# Patient Record
Sex: FEMALE | Race: WHITE | NOT HISPANIC OR LATINO | Employment: FULL TIME | URBAN - METROPOLITAN AREA
[De-identification: names, ages, dates, MRNs, and addresses within clinical notes are randomized per-mention and may not be internally consistent; named-entity substitution may affect disease eponyms.]

---

## 2017-04-06 DIAGNOSIS — Z01.812 PRE-PROCEDURAL LABORATORY EXAMINATION: ICD-10-CM

## 2017-04-06 LAB
AMORPH CRY #/AREA URNS HPF: PRESENT /HPF
ANION GAP SERPL CALC-SCNC: 8 MMOL/L (ref 0–11.9)
APPEARANCE UR: CLEAR
BILIRUB UR QL STRIP.AUTO: NEGATIVE
BUN SERPL-MCNC: 8 MG/DL (ref 8–22)
CALCIUM SERPL-MCNC: 10.4 MG/DL (ref 8.5–10.5)
CHLORIDE SERPL-SCNC: 104 MMOL/L (ref 96–112)
CO2 SERPL-SCNC: 25 MMOL/L (ref 20–33)
COLOR UR: ABNORMAL
CREAT SERPL-MCNC: 0.62 MG/DL (ref 0.5–1.4)
CULTURE IF INDICATED INDCX: NO UA CULTURE
EPI CELLS #/AREA URNS HPF: ABNORMAL /HPF
ERYTHROCYTE [DISTWIDTH] IN BLOOD BY AUTOMATED COUNT: 39.8 FL (ref 35.9–50)
GFR SERPL CREATININE-BSD FRML MDRD: >60 ML/MIN/1.73 M 2
GLUCOSE SERPL-MCNC: 83 MG/DL (ref 65–99)
GLUCOSE UR STRIP.AUTO-MCNC: NEGATIVE MG/DL
HCG SERPL QL: NEGATIVE
HCT VFR BLD AUTO: 43.6 % (ref 37–47)
HGB BLD-MCNC: 14.6 G/DL (ref 12–16)
KETONES UR STRIP.AUTO-MCNC: NEGATIVE MG/DL
LEUKOCYTE ESTERASE UR QL STRIP.AUTO: NEGATIVE
MCH RBC QN AUTO: 28.9 PG (ref 27–33)
MCHC RBC AUTO-ENTMCNC: 33.5 G/DL (ref 33.6–35)
MCV RBC AUTO: 86.3 FL (ref 81.4–97.8)
MICRO URNS: ABNORMAL
MUCOUS THREADS #/AREA URNS HPF: ABNORMAL /HPF
NITRITE UR QL STRIP.AUTO: NEGATIVE
PH UR STRIP.AUTO: 5.5 [PH]
PLATELET # BLD AUTO: 252 K/UL (ref 164–446)
PMV BLD AUTO: 10.4 FL (ref 9–12.9)
POTASSIUM SERPL-SCNC: 3.9 MMOL/L (ref 3.6–5.5)
PROT UR QL STRIP: NEGATIVE MG/DL
RBC # BLD AUTO: 5.05 M/UL (ref 4.2–5.4)
RBC # URNS HPF: ABNORMAL /HPF
RBC UR QL AUTO: ABNORMAL
SODIUM SERPL-SCNC: 137 MMOL/L (ref 135–145)
SP GR UR STRIP.AUTO: 1.01
WBC # BLD AUTO: 6.9 K/UL (ref 4.8–10.8)
WBC #/AREA URNS HPF: ABNORMAL /HPF

## 2017-04-06 PROCEDURE — 80048 BASIC METABOLIC PNL TOTAL CA: CPT

## 2017-04-06 PROCEDURE — 85027 COMPLETE CBC AUTOMATED: CPT

## 2017-04-06 PROCEDURE — 81001 URINALYSIS AUTO W/SCOPE: CPT

## 2017-04-06 PROCEDURE — 36415 COLL VENOUS BLD VENIPUNCTURE: CPT

## 2017-04-06 PROCEDURE — 84703 CHORIONIC GONADOTROPIN ASSAY: CPT

## 2017-04-18 NOTE — H&P
HISTORY OF PRESENT ILLNESS:  The patient is a 31-year-old  4, para 3,   female, who is having significant problems including severe dyspareunia,   pelvic pain, dysmenorrhea and documented endometriosis by laparoscopy.  This   pain has been failed to be controlled with birth control pills.  Currently,   using a vasectomy for contraception.  Menses are regular, occasional spotting,   dysmenorrhea is associated with her cycles.    PAST MEDICAL HISTORY:  The patient denies any significant past medical   history.    PAST SURGICAL HISTORY:  She has no surgical history listed, but she did have a   pelviscopy previously.  This documented endometriosis and was performed by   Dr. Manning in [place].    FAMILY HISTORY:  Significant for diabetes, heart disease, and ovarian cancer.    REPRODUCTIVE HISTORY:   4, para 3.    SOCIAL HISTORY:  Denies alcohol and works .  Denies tobacco use.     female.    REVIEW OF SYSTEMS:  Negative other than the above referenced complaints.    Specifically denies fever, chills, nausea, vomiting, constipation, diarrhea,   chest pain, shortness breath, chronic cough, coughing up blood.    PHYSICAL EXAMINATION:  GENERAL:  Well-developed, pleasant female, in no acute distress.  HEENT:  Normocephalic.  EOMs intact.  Pupils reactive to light.  Throat clear.  NECK:  Supple without adenopathy.  CHEST:  Clear to auscultation.  CARDIAC:  Regular rhythm, no murmurs.  BREASTS:  No masses.  No dimpling, no retraction or axillary adenopathy.  PELVIC:  Reveals normal external genitalia.  Normal-appearing cervix.  Uterus   is mildly enlarged.  No adnexal masses were appreciated.  She does have   tenderness to movement of the uterus and tenderness in both adnexa to   palpation.    IMPRESSION:  Dysmenorrhea, pelvic pain, documented endometriosis refractory to   control with birth control pills.    PLAN:  The patient is being admitted for total laparoscopic hysterectomy,   bilateral  salpingectomy, preservation of her ovaries.  Surgical risks and   complications have been explained to the patient at length and in detail.    Specific risks, bleeding, infection, damage to adjacent structures including   bowel, bladder, ureter, major blood vessels were all discussed.  Possibility   of transfusion, anesthetic risks, thromboembolic risk, and laparotomy were   discussed.  The patient understands and accepts these risks.  Preoperative and   postoperative expectations had been discussed.  The instructions have been   given.  The patient has her medications at home for postop.       ____________________________________     MD EMILY BOWER / TIMO    DD:  04/18/2017 08:10:56  DT:  04/18/2017 09:17:08    D#:  [id number]  Job#:  [id number]

## 2017-04-19 ENCOUNTER — HOSPITAL ENCOUNTER (OUTPATIENT)
Facility: MEDICAL CENTER | Age: 32
End: 2017-04-19
Attending: OBSTETRICS & GYNECOLOGY | Admitting: OBSTETRICS & GYNECOLOGY
Payer: COMMERCIAL

## 2017-04-19 VITALS
TEMPERATURE: 97.9 F | OXYGEN SATURATION: 98 % | HEIGHT: 62 IN | WEIGHT: 123.46 LBS | RESPIRATION RATE: 12 BRPM | SYSTOLIC BLOOD PRESSURE: 110 MMHG | DIASTOLIC BLOOD PRESSURE: 74 MMHG | BODY MASS INDEX: 22.72 KG/M2 | HEART RATE: 88 BPM

## 2017-04-19 PROBLEM — R10.32 ABDOMINAL PAIN, LEFT LOWER QUADRANT: Status: ACTIVE | Noted: 2017-04-19

## 2017-04-19 PROBLEM — R10.32 ABDOMINAL PAIN, LEFT LOWER QUADRANT: Status: RESOLVED | Noted: 2017-04-19 | Resolved: 2017-04-19

## 2017-04-19 LAB
HCG UR QL: NEGATIVE
SP GR UR REFRACTOMETRY: 1.02

## 2017-04-19 PROCEDURE — 500854 HCHG NEEDLE, INSUFFLATION FOR STEP: Performed by: OBSTETRICS & GYNECOLOGY

## 2017-04-19 PROCEDURE — 700111 HCHG RX REV CODE 636 W/ 250 OVERRIDE (IP)

## 2017-04-19 PROCEDURE — 502679 HCHG MANIPULATOR, UTRN DAVINCI: Performed by: OBSTETRICS & GYNECOLOGY

## 2017-04-19 PROCEDURE — 160048 HCHG OR STATISTICAL LEVEL 1-5: Performed by: OBSTETRICS & GYNECOLOGY

## 2017-04-19 PROCEDURE — 500002 HCHG ADHESIVE, DERMABOND: Performed by: OBSTETRICS & GYNECOLOGY

## 2017-04-19 PROCEDURE — 160031 HCHG SURGERY MINUTES - 1ST 30 MINS LEVEL 5: Performed by: OBSTETRICS & GYNECOLOGY

## 2017-04-19 PROCEDURE — 160047 HCHG PACU  - EA ADDL 30 MINS PHASE II: Performed by: OBSTETRICS & GYNECOLOGY

## 2017-04-19 PROCEDURE — 500025 HCHG ARMREST, CRADLE FOAM: Performed by: OBSTETRICS & GYNECOLOGY

## 2017-04-19 PROCEDURE — 160046 HCHG PACU - 1ST 60 MINS PHASE II: Performed by: OBSTETRICS & GYNECOLOGY

## 2017-04-19 PROCEDURE — 502714 HCHG ROBOTIC SURGERY SERVICES: Performed by: OBSTETRICS & GYNECOLOGY

## 2017-04-19 PROCEDURE — 501330 HCHG SET, CYSTO IRRIG TUBING: Performed by: OBSTETRICS & GYNECOLOGY

## 2017-04-19 PROCEDURE — 110382 HCHG SHELL REV 271: Performed by: OBSTETRICS & GYNECOLOGY

## 2017-04-19 PROCEDURE — 501838 HCHG SUTURE GENERAL: Performed by: OBSTETRICS & GYNECOLOGY

## 2017-04-19 PROCEDURE — 160009 HCHG ANES TIME/MIN: Performed by: OBSTETRICS & GYNECOLOGY

## 2017-04-19 PROCEDURE — 700101 HCHG RX REV CODE 250

## 2017-04-19 PROCEDURE — 502000 HCHG MISC OR IMPLANTS RC 0278: Performed by: OBSTETRICS & GYNECOLOGY

## 2017-04-19 PROCEDURE — 501587 HCHG TROCAR, VERSAPORT 12MM LONG: Performed by: OBSTETRICS & GYNECOLOGY

## 2017-04-19 PROCEDURE — 160025 RECOVERY II MINUTES (STATS): Performed by: OBSTETRICS & GYNECOLOGY

## 2017-04-19 PROCEDURE — 88307 TISSUE EXAM BY PATHOLOGIST: CPT

## 2017-04-19 PROCEDURE — 160036 HCHG PACU - EA ADDL 30 MINS PHASE I: Performed by: OBSTETRICS & GYNECOLOGY

## 2017-04-19 PROCEDURE — 160002 HCHG RECOVERY MINUTES (STAT): Performed by: OBSTETRICS & GYNECOLOGY

## 2017-04-19 PROCEDURE — 502594 HCHG SCISSOR HANDLE, HARMONIC ACE: Performed by: OBSTETRICS & GYNECOLOGY

## 2017-04-19 PROCEDURE — 700102 HCHG RX REV CODE 250 W/ 637 OVERRIDE(OP)

## 2017-04-19 PROCEDURE — 160042 HCHG SURGERY MINUTES - EA ADDL 1 MIN LEVEL 5: Performed by: OBSTETRICS & GYNECOLOGY

## 2017-04-19 PROCEDURE — A4606 OXYGEN PROBE USED W OXIMETER: HCPCS | Performed by: OBSTETRICS & GYNECOLOGY

## 2017-04-19 PROCEDURE — 110371 HCHG SHELL REV 272: Performed by: OBSTETRICS & GYNECOLOGY

## 2017-04-19 PROCEDURE — 81025 URINE PREGNANCY TEST: CPT

## 2017-04-19 PROCEDURE — 160035 HCHG PACU - 1ST 60 MINS PHASE I: Performed by: OBSTETRICS & GYNECOLOGY

## 2017-04-19 PROCEDURE — A9270 NON-COVERED ITEM OR SERVICE: HCPCS

## 2017-04-19 PROCEDURE — 502240 HCHG MISC OR SUPPLY RC 0272: Performed by: OBSTETRICS & GYNECOLOGY

## 2017-04-19 DEVICE — TISSEEL 4ML ----MUST ORDER A MIN OF 6EA----: Type: IMPLANTABLE DEVICE | Status: FUNCTIONAL

## 2017-04-19 RX ORDER — BUPIVACAINE HYDROCHLORIDE AND EPINEPHRINE 2.5; 5 MG/ML; UG/ML
INJECTION, SOLUTION EPIDURAL; INFILTRATION; INTRACAUDAL; PERINEURAL
Status: DISCONTINUED | OUTPATIENT
Start: 2017-04-19 | End: 2017-04-19 | Stop reason: HOSPADM

## 2017-04-19 RX ORDER — SODIUM CHLORIDE, SODIUM LACTATE, POTASSIUM CHLORIDE, CALCIUM CHLORIDE 600; 310; 30; 20 MG/100ML; MG/100ML; MG/100ML; MG/100ML
1000 INJECTION, SOLUTION INTRAVENOUS
Status: COMPLETED | OUTPATIENT
Start: 2017-04-19 | End: 2017-04-19

## 2017-04-19 RX ORDER — MIDAZOLAM HYDROCHLORIDE 1 MG/ML
INJECTION INTRAMUSCULAR; INTRAVENOUS
Status: DISCONTINUED
Start: 2017-04-19 | End: 2017-04-19 | Stop reason: HOSPADM

## 2017-04-19 RX ORDER — SODIUM CHLORIDE, SODIUM LACTATE, POTASSIUM CHLORIDE, CALCIUM CHLORIDE 600; 310; 30; 20 MG/100ML; MG/100ML; MG/100ML; MG/100ML
INJECTION, SOLUTION INTRAVENOUS CONTINUOUS
Status: DISCONTINUED | OUTPATIENT
Start: 2017-04-19 | End: 2017-04-19 | Stop reason: HOSPADM

## 2017-04-19 RX ORDER — OXYCODONE HCL 5 MG/5 ML
SOLUTION, ORAL ORAL
Status: COMPLETED
Start: 2017-04-19 | End: 2017-04-19

## 2017-04-19 RX ORDER — OXYCODONE HYDROCHLORIDE AND ACETAMINOPHEN 5; 325 MG/1; MG/1
1 TABLET ORAL
Status: DISCONTINUED | OUTPATIENT
Start: 2017-04-19 | End: 2017-04-19 | Stop reason: HOSPADM

## 2017-04-19 RX ORDER — METOCLOPRAMIDE HYDROCHLORIDE 5 MG/ML
10 INJECTION INTRAMUSCULAR; INTRAVENOUS EVERY 4 HOURS PRN
Status: DISCONTINUED | OUTPATIENT
Start: 2017-04-19 | End: 2017-04-19 | Stop reason: HOSPADM

## 2017-04-19 RX ORDER — DIPHENHYDRAMINE HYDROCHLORIDE 50 MG/ML
INJECTION INTRAMUSCULAR; INTRAVENOUS
Status: COMPLETED
Start: 2017-04-19 | End: 2017-04-19

## 2017-04-19 RX ORDER — KETOROLAC TROMETHAMINE 30 MG/ML
INJECTION, SOLUTION INTRAMUSCULAR; INTRAVENOUS
Status: COMPLETED
Start: 2017-04-19 | End: 2017-04-19

## 2017-04-19 RX ORDER — HYDROMORPHONE HYDROCHLORIDE 2 MG/ML
INJECTION, SOLUTION INTRAMUSCULAR; INTRAVENOUS; SUBCUTANEOUS
Status: DISCONTINUED
Start: 2017-04-19 | End: 2017-04-19 | Stop reason: HOSPADM

## 2017-04-19 RX ORDER — ONDANSETRON 2 MG/ML
4 INJECTION INTRAMUSCULAR; INTRAVENOUS EVERY 6 HOURS PRN
Status: DISCONTINUED | OUTPATIENT
Start: 2017-04-19 | End: 2017-04-19 | Stop reason: HOSPADM

## 2017-04-19 RX ORDER — LIDOCAINE HYDROCHLORIDE 10 MG/ML
INJECTION, SOLUTION INFILTRATION; PERINEURAL
Status: COMPLETED
Start: 2017-04-19 | End: 2017-04-19

## 2017-04-19 RX ORDER — ACETAMINOPHEN 500 MG
1000 TABLET ORAL EVERY 6 HOURS PRN
COMMUNITY

## 2017-04-19 RX ADMIN — FENTANYL CITRATE 25 MCG: 50 INJECTION, SOLUTION INTRAMUSCULAR; INTRAVENOUS at 09:35

## 2017-04-19 RX ADMIN — HYDROMORPHONE HYDROCHLORIDE 0.2 MG: 1 INJECTION, SOLUTION INTRAMUSCULAR; INTRAVENOUS; SUBCUTANEOUS at 09:35

## 2017-04-19 RX ADMIN — SODIUM CHLORIDE, SODIUM LACTATE, POTASSIUM CHLORIDE, CALCIUM CHLORIDE 1000 ML: 600; 310; 30; 20 INJECTION, SOLUTION INTRAVENOUS at 06:30

## 2017-04-19 RX ADMIN — KETOROLAC TROMETHAMINE 30 MG: 30 INJECTION, SOLUTION INTRAMUSCULAR; INTRAVENOUS at 09:25

## 2017-04-19 RX ADMIN — DIPHENHYDRAMINE HYDROCHLORIDE 6.25 MG: 50 INJECTION INTRAMUSCULAR; INTRAVENOUS at 10:30

## 2017-04-19 RX ADMIN — OXYCODONE HYDROCHLORIDE 10 MG: 5 SOLUTION ORAL at 09:30

## 2017-04-19 RX ADMIN — LIDOCAINE HYDROCHLORIDE 0.1 ML: 10 INJECTION, SOLUTION INFILTRATION; PERINEURAL at 06:30

## 2017-04-19 ASSESSMENT — PAIN SCALES - GENERAL
PAINLEVEL_OUTOF10: 0

## 2017-04-19 ASSESSMENT — PAIN SCALES - WONG BAKER
WONGBAKER_NUMERICALRESPONSE: DOESN'T HURT AT ALL
WONGBAKER_NUMERICALRESPONSE: HURTS JUST A LITTLE BIT
WONGBAKER_NUMERICALRESPONSE: HURTS JUST A LITTLE BIT

## 2017-04-19 NOTE — DISCHARGE INSTRUCTIONS
ACTIVITY: Rest and take it easy for the first 24 hours.  A responsible adult is recommended to remain with you during that time.  It is normal to feel sleepy.  We encourage you to not do anything that requires balance, judgment or coordination.    MILD FLU-LIKE SYMPTOMS ARE NORMAL. YOU MAY EXPERIENCE GENERALIZED MUSCLE ACHES, THROAT IRRITATION, HEADACHE AND/OR SOME NAUSEA.    FOR 24 HOURS DO NOT:  Drive, operate machinery or run household appliances.  Drink beer or alcoholic beverages.   Make important decisions or sign legal documents.    SPECIAL INSTRUCTIONS:   Keep bowels soft and regular- may use milk of magnesia or colace as needed for constipation  Pelvic rest for 6 wks or until MD gives approval  May shower as desired  Clear liquids only until passing gas  May use Milk of Magnesia PRN constipation  May use Colace OTC 2 Xs daily PRN constipation  No tub baths, swimming or hot tubs for 6 weeks      DIET: To avoid nausea, slowly advance diet as tolerated, avoiding spicy or greasy foods for the first day.  Add more substantial food to your diet according to your physician's instructions.  INCREASE FLUIDS AND FIBER TO AVOID CONSTIPATION.    SURGICAL DRESSING/BATHING:   May shower as desired  No tub baths, swimming or hot tubs for 6 weeks    FOLLOW-UP APPOINTMENT:  A follow-up appointment should be arranged with your doctor in 2 weeks; call to schedule.    You should CALL YOUR PHYSICIAN if you develop:  Fever greater than 101 degrees F.  Pain not relieved by medication, or persistent nausea or vomiting.  Excessive bleeding (blood soaking through dressing) or unexpected drainage from the wound.  Extreme redness or swelling around the incision site, drainage of pus or foul smelling drainage.  Inability to urinate or empty your bladder within 8 hours.  Problems with breathing or chest pain.    You should call 911 if you develop problems with breathing or chest pain.  If you are unable to contact your doctor or  surgical center, you should go to the nearest emergency room or urgent care center.  Physician's telephone #: *[phone]**    If any questions arise, call your doctor.  If your doctor is not available, please feel free to call the Surgical Center at [phone].  The Center is open Monday through Friday from 7AM to 7PM.  You can also call the HEALTH HOTLINE open 24 hours/day, 7 days/week and speak to a nurse at [phone], or toll free at [phone].    A registered nurse may call you a few days after your surgery to see how you are doing after your procedure.    MEDICATIONS: Resume taking daily medication.  Take prescribed pain medication with food.  If no medication is prescribed, you may take non-aspirin pain medication if needed.  PAIN MEDICATION CAN BE VERY CONSTIPATING.  Take a stool softener or laxative such as senokot, pericolace, or milk of magnesia if needed.    Prescription given for *oxycodone, zofran and Reglan**.  Last pain medication given at *9:30am**.    If your physician has prescribed pain medication that includes Acetaminophen (Tylenol), do not take additional Acetaminophen (Tylenol) while taking the prescribed medication.    Depression / Suicide Risk    As you are discharged from this [facility] facility, it is important to learn how to keep safe from harming yourself.    Recognize the warning signs:  · Abrupt changes in personality, positive or negative- including increase in energy   · Giving away possessions  · Change in eating patterns- significant weight changes-  positive or negative  · Change in sleeping patterns- unable to sleep or sleeping all the time   · Unwillingness or inability to communicate  · Depression  · Unusual sadness, discouragement and loneliness  · Talk of wanting to die  · Neglect of personal appearance   · Rebelliousness- reckless behavior  · Withdrawal from people/activities they love  · Confusion- inability to concentrate     If you or a loved one  observes any of these behaviors or has concerns about self-harm, here's what you can do:  · Talk about it- your feelings and reasons for harming yourself  · Remove any means that you might use to hurt yourself (examples: pills, rope, extension cords, firearm)  · Get professional help from the community (Mental Health, Substance Abuse, psychological counseling)  · Do not be alone:Call your Safe Contact- someone whom you trust who will be there for you.  · Call your local CRISIS HOTLINE 777-2863 or 769-489-6650  · Call your local Children's Mobile Crisis Response Team [place] (071) 099-2082 or www.[employer]  · Call the toll free National Suicide Prevention Hotlines   · National Suicide Prevention Lifeline 112-574-ASGD (3587)  · National Hope Line Network 800-SUICIDE (945-9729)

## 2017-04-19 NOTE — OR NURSING
Assumed care back of pt. Assisted pt OOB to restroom, ambulates well. Pt was able to void approx. 150cc measured by hat. Pt returned to bed. Tolerated activity well.

## 2017-04-19 NOTE — OR SURGEON
Immediate Post-Operative Note      PreOp Diagnosis: Dysmenorrhea, endometriosis  PostOp Diagnosis: Same    Procedure(s):  HYSTERECTOMY ROBOTIC TOTAL WITH BILATERAL SAPINGECTOMY  - Wound Class: Clean Contaminated    Surgeon(s):  Peyman Nelson M.D.    Anesthesiologist/Type of Anesthesia:  Anesthesiologist: Leola Murray M.D./General    Surgical Staff:  Circulator: Alyssa Amador R.N.  Scrub Person: Sol Olivier Assist: Sobia Mullen  Count [place]: Say Coffey R.N.    Specimen: Uterus and fallopian tubes    Estimated Blood Loss: 25ml    Findings: endometriosis present on uterus and culdosac, patent ureters and normal bladder at cysto post procedure    Complications: None        4/19/2017 9:28 AM Peyman Nelson

## 2017-04-19 NOTE — OP REPORT
DATE OF SERVICE:  04/19/2017    PREOPERATIVE DIAGNOSES:  Documented endometriosis by laparoscopy,   dysmenorrhea, pelvic pain.    PREOPERATIVE DIAGNOSES:  Documented endometriosis by laparoscopy,   dysmenorrhea, pelvic pain with endometriosis confirmed.    OPERATION PERFORMED:  Total laparoscopic hysterectomy using da Herman robotic   technology, bilateral salpingectomy and cystoscopy.    SURGEON:  Peyman Nelson MD.    ASSISTANT:  Sobia Mullen.    INDICATIONS:  As above.    PROCEDURE:  Patient was taken to the operating room and placed on the   operating table and successfully given a general anesthetic.  She was placed   in the dorsal lithotomy position in the [facility].  Physiological   positioning was maintained throughout the case.  She was prepped and draped   both abdominally and pelvically and perineally in the usual fashion.  At this   juncture, she was draped as mentioned and the V-Care catheter was placed for   uterine manipulation by Sobia Mullen. Once this was in place, the uterus   was elevated, location of the camera port was delineated and small incision   was made above the umbilicus by about an 1-1/2 to 2 inches.  At this juncture,   the abdominal wall elevation was accomplished and the step system Veress   needle was inserted on a single attempt without difficulty.  Position was   confirmed with low pressures and pneumoperitoneum was created normally.    Following this, the primary trocar was inserted through the step system and   the camera was used to delineate the abdomen.  There was a single adhesion up   to the umbilicus from previous laparoscopy.  At this juncture, the left lower   quadrant port was placed which was an 8 mm port.  This was approximately 2 cm   above the anterior superior iliac spine and slightly medial.  Once this was in   position, scissors were used to remove the adhesions previously described.    Following that, the 2 lateral ports were placed without  difficulty.  Patient   was placed in steep Trendelenburg and docking was accomplished without   difficulty.  Twenty seven degrees Trendelenburg was used.  Following this, all   instruments were placed under direct visualization and the procedure was   initiated.  Findings at this juncture showed multiple small blister-like   lesions on the uterus and some on the peritoneal surfaces consistent with   early endometriosis.  There was some old blood in the cul-de-sac and also   consistent with early endometriosis.  The ovaries looked normal.  Fallopian   tubes looked normal.  The left fallopian tube was elevated.  The mesosalpinx   was cauterized and incised and this specimen was removed through the   assistance port.  Following this, the round ligament on the left side was   cauterized and incised.  The uterine ovarian ligament on the left side was   cauterized and incised and the area was hemostatic.  Leaves of the broad   ligament were now opened up.  The anterior leaf was incised taking the bladder   off the lower uterine segment on the left side.  Posterior leaf was also   incised down to the level of the VCare ring hugging the uterus.  At this   juncture, the individual vessels of the uterine vascular bundle and anteriorly   were cauterized, incised and reflected laterally.  Once this was   accomplished, we moved to the other side.  On the right side, the round   ligament was cauterized and incised.  The fallopian tube was then removed by   cauterizing the mesosalpinx and removing the tube through the assistance port.    Following this, the uterine ovarian ligament was cauterized and incised.    Hemostasis was complete.  At this juncture, the leaves of the broad ligament   were again  and the bladder was taken off of the lower uterine   segment.  The posterior leaf of the broad ligament was cauterized and incised   and reflected slightly laterally.  The uterine vascular bundles were   identified  individually cauterized and incised and reflected laterally.  Once   we had basically cleaned up the attachments and the vessels were all reflected   beyond the level of the VCare ring, entry into the vagina was accomplished   anteriorly.  The VCare ring was followed around carefully and ultimately the   cervix was completely detached from the vagina.  The specimen was removed.    Irrigation was carried out.  Angle sutures were placed at 3 and 9 o'clock.  A   Quill suture had been delivered through the vagina.  Next, the central portion   of the vaginal cuff was closed with a Quill starting in the middle and   running laterally to the angle sutures previously placed.  A second layer was   placed and attached to the uterosacral ligaments for support of the vagina.    Hemostasis was complete.  ____ was sprayed on the cuff.  There were no   complications.  Instruments were removed.  The camera port was closed with an   EndoStitch and all other ports were closed with deep stitches and BioGlue or   Dermabond.  Cystoscopy was performed.  Fluorescein dye was seen to flow freely   from both ureteral meatuses.  Bladder was absolutely normal with no evidence   of any trauma.  There were no apparent complications.  The patient was taken   to recovery room in stable condition.  Estimated blood loss was only about 25   mL    SPECIMENS:  Included the uterus and both fallopian tubes.       ____________________________________     MD EMILY BOWER / TIMO    DD:  04/19/2017 09:37:19  DT:  04/19/2017 10:20:00    D#:  [id number]  Job#:  [id number]

## 2017-04-19 NOTE — IP AVS SNAPSHOT
" Home Care Instructions                                                                                                                Name:Mai West  Medical Record Number:[id number]  CSN: [id number]    YOB: 1985   Age: 31 y.o.  Sex: female  HT:1.575 m (5' 2\") WT: 56 kg (123 lb 7.3 oz)          Admit Date: 4/19/2017     Discharge Date:   Today's Date: 4/19/2017  Attending Doctor:  Peyman Nelson M.D.                  Allergies:  Tobramycin                Discharge Instructions         ACTIVITY: Rest and take it easy for the first 24 hours.  A responsible adult is recommended to remain with you during that time.  It is normal to feel sleepy.  We encourage you to not do anything that requires balance, judgment or coordination.    MILD FLU-LIKE SYMPTOMS ARE NORMAL. YOU MAY EXPERIENCE GENERALIZED MUSCLE ACHES, THROAT IRRITATION, HEADACHE AND/OR SOME NAUSEA.    FOR 24 HOURS DO NOT:  Drive, operate machinery or run household appliances.  Drink beer or alcoholic beverages.   Make important decisions or sign legal documents.    SPECIAL INSTRUCTIONS:   Keep bowels soft and regular- may use milk of magnesia or colace as needed for constipation  Pelvic rest for 6 wks or until MD gives approval  May shower as desired  Clear liquids only until passing gas  May use Milk of Magnesia PRN constipation  May use Colace OTC 2 Xs daily PRN constipation  No tub baths, swimming or hot tubs for 6 weeks      DIET: To avoid nausea, slowly advance diet as tolerated, avoiding spicy or greasy foods for the first day.  Add more substantial food to your diet according to your physician's instructions.  INCREASE FLUIDS AND FIBER TO AVOID CONSTIPATION.    SURGICAL DRESSING/BATHING:   May shower as desired  No tub baths, swimming or hot tubs for 6 weeks    FOLLOW-UP APPOINTMENT:  A follow-up appointment should be arranged with your doctor in 2 weeks; call to schedule.    You should CALL YOUR PHYSICIAN if you develop:  Fever greater " than 101 degrees F.  Pain not relieved by medication, or persistent nausea or vomiting.  Excessive bleeding (blood soaking through dressing) or unexpected drainage from the wound.  Extreme redness or swelling around the incision site, drainage of pus or foul smelling drainage.  Inability to urinate or empty your bladder within 8 hours.  Problems with breathing or chest pain.    You should call 911 if you develop problems with breathing or chest pain.  If you are unable to contact your doctor or surgical center, you should go to the nearest emergency room or urgent care center.  Physician's telephone #: *[phone]**    If any questions arise, call your doctor.  If your doctor is not available, please feel free to call the Surgical Center at [phone].  The Center is open Monday through Friday from 7AM to 7PM.  You can also call the [employer] HOTLINE open 24 hours/day, 7 days/week and speak to a nurse at [phone], or toll free at [phone].    A registered nurse may call you a few days after your surgery to see how you are doing after your procedure.    MEDICATIONS: Resume taking daily medication.  Take prescribed pain medication with food.  If no medication is prescribed, you may take non-aspirin pain medication if needed.  PAIN MEDICATION CAN BE VERY CONSTIPATING.  Take a stool softener or laxative such as senokot, pericolace, or milk of magnesia if needed.    Prescription given for *oxycodone, zofran and Reglan**.  Last pain medication given at *9:30am**.    If your physician has prescribed pain medication that includes Acetaminophen (Tylenol), do not take additional Acetaminophen (Tylenol) while taking the prescribed medication.    Depression / Suicide Risk    As you are discharged from this [facility] facility, it is important to learn how to keep safe from harming yourself.    Recognize the warning signs:  · Abrupt changes in personality, positive or negative- including increase in energy    · Giving away possessions  · Change in eating patterns- significant weight changes-  positive or negative  · Change in sleeping patterns- unable to sleep or sleeping all the time   · Unwillingness or inability to communicate  · Depression  · Unusual sadness, discouragement and loneliness  · Talk of wanting to die  · Neglect of personal appearance   · Rebelliousness- reckless behavior  · Withdrawal from people/activities they love  · Confusion- inability to concentrate     If you or a loved one observes any of these behaviors or has concerns about self-harm, here's what you can do:  · Talk about it- your feelings and reasons for harming yourself  · Remove any means that you might use to hurt yourself (examples: pills, rope, extension cords, firearm)  · Get professional help from the community (Mental Health, Substance Abuse, psychological counseling)  · Do not be alone:Call your Safe Contact- someone whom you trust who will be there for you.  · Call your local CRISIS HOTLINE 113-9366 or 892-131-2671  · Call your local Children's Mobile Crisis Response Team [place] (433) 610-0484 or [employer]  · Call the toll free National Suicide Prevention Hotlines   · National Suicide Prevention Lifeline 431-850-LGDV (5780)  · National Hope Line Network 800-SUICIDE (716-8107)       Medication List      CONTINUE taking these medications        Instructions    Morning Afternoon Evening Bedtime    acetaminophen 500 MG Tabs   Commonly known as:  TYLENOL        Take 1,000 mg by mouth every 6 hours as needed.   Dose:  1000 mg                        DAYQUIL MULTI-SYMPTOM PO        Take 1 Tab by mouth as needed.   Dose:  1 Tab                        EMERGEN-C IMMUNE PO        Take 1 Packet by mouth as needed.   Dose:  1 Packet                                Medication Information     Above and/or attached are the medications your physician expects you to take upon discharge. Review all of your home medications and newly  ordered medications with your doctor and/or pharmacist. Follow medication instructions as directed by your doctor and/or pharmacist. Please keep your medication list with you and share with your physician. Update the information when medications are discontinued, doses are changed, or new medications (including over-the-counter products) are added; and carry medication information at all times in the event of emergency situations.        Resources     Quit Smoking / Tobacco Use:    I understand the use of any tobacco products increases my chance of suffering from future heart disease or stroke and could cause other illnesses which may shorten my life. Quitting the use of tobacco products is the single most important thing I can do to improve my health. For further information on smoking / tobacco cessation call a Toll Free Quit Line at 1-874.968.1431 (*[facility] [employer] [place]) or 1-834.570.8435 (American Lung Association) or you can access the web based program at www.lungusa.org.    [place] Tobacco Users Help Line:  (388) 109-9628       Toll Free: 1-471.509.9832  Quit Tobacco Program [facility] (049)002-8059    Crisis Hotline:    [place] Crisis Hotline:  2-795-RXLBVUC or 1-230.839.3026    [place] Crisis Hotline:    1-823.289.7982 or 705-059-8295    Discharge Survey:   Thank you for choosing [facility]. We hope we did everything we could to make your hospital stay a pleasant one. You may be receiving a survey and we would appreciate your time and participation in answering the questions. Your input is very valuable to us in our efforts to improve our service to our patients and their families.            Signatures     My signature on this form indicates that:    1. I acknowledge receipt and understanding of these Home Care Instruction.  2. My questions regarding this information have been answered to my satisfaction.  3. I have formulated a plan with my discharge nurse to obtain my  prescribed medications for home.    __________________________________      __________________________________                   Patient Signature                                 Guardian/Responsible Adult Signature      __________________________________                 __________       ________                       Nurse Signature                                               Date                 Time

## 2017-04-19 NOTE — OR NURSING
Pt arrived in phase-II. A/O, VSS, does not c/o pain at this time.  brought to bedside. Being sent to lunch, report and pt care handed off to [facility] SR

## 2017-04-19 NOTE — IP AVS SNAPSHOT
4/19/2017    Mai West  [address] [facility] [address]    Dear Mai:    Critical access hospital wants to ensure your discharge home is safe and you or your loved ones have had all of your questions answered regarding your care after you leave the hospital.    Below is a list of resources and contact information should you have any questions regarding your hospital stay, follow-up instructions, or active medical symptoms.    Questions or Concerns Regarding… Contact   Medical Questions Related to Your Discharge  (7 days a week, 8am-5pm) Contact a Nurse Care Coordinator   [phone]   Medical Questions Not Related to Your Discharge  (24 hours a day / 7 days a week)  Contact the Nurse Health Line   [phone]    Medications or Discharge Instructions Refer to your discharge packet   or contact your [facility] Primary Care Provider   [phone]   Follow-up Appointment(s) Schedule your appointment via [employer]   or contact Scheduling [phone]   Billing Review your statement via [employer]  or contact Billing [phone]   Medical Records Review your records via [employer]   or contact Medical Records [phone]     You may receive a telephone call within two days of discharge. This call is to make certain you understand your discharge instructions and have the opportunity to have any questions answered. You can also easily access your medical information, test results and upcoming appointments via the [employer] free online health management tool. You can learn more and sign up at [employer]. For assistance setting up your [employer] account, please call [phone].    Once again, we want to ensure your discharge home is safe and that you have a clear understanding of any next steps in your care. If you have any questions or concerns, please do not hesitate to contact us, we are here for you. Thank you for choosing [facility] for your healthcare needs.    Sincerely,    Your [facility] Healthcare Team

## 2020-02-24 ENCOUNTER — APPOINTMENT (RX ONLY)
Dept: URBAN - METROPOLITAN AREA CLINIC 4 | Facility: CLINIC | Age: 35
Setting detail: DERMATOLOGY
End: 2020-02-24

## 2020-02-24 DIAGNOSIS — L81.4 OTHER MELANIN HYPERPIGMENTATION: ICD-10-CM

## 2020-02-24 DIAGNOSIS — Z87.2 PERSONAL HISTORY OF DISEASES OF THE SKIN AND SUBCUTANEOUS TISSUE: ICD-10-CM

## 2020-02-24 DIAGNOSIS — D18.0 HEMANGIOMA: ICD-10-CM

## 2020-02-24 DIAGNOSIS — L82.1 OTHER SEBORRHEIC KERATOSIS: ICD-10-CM

## 2020-02-24 DIAGNOSIS — D22 MELANOCYTIC NEVI: ICD-10-CM

## 2020-02-24 DIAGNOSIS — Z71.89 OTHER SPECIFIED COUNSELING: ICD-10-CM

## 2020-02-24 PROBLEM — D48.5 NEOPLASM OF UNCERTAIN BEHAVIOR OF SKIN: Status: ACTIVE | Noted: 2020-02-24

## 2020-02-24 PROBLEM — D22.39 MELANOCYTIC NEVI OF OTHER PARTS OF FACE: Status: ACTIVE | Noted: 2020-02-24

## 2020-02-24 PROBLEM — D18.01 HEMANGIOMA OF SKIN AND SUBCUTANEOUS TISSUE: Status: ACTIVE | Noted: 2020-02-24

## 2020-02-24 PROBLEM — D22.61 MELANOCYTIC NEVI OF RIGHT UPPER LIMB, INCLUDING SHOULDER: Status: ACTIVE | Noted: 2020-02-24

## 2020-02-24 PROBLEM — D22.62 MELANOCYTIC NEVI OF LEFT UPPER LIMB, INCLUDING SHOULDER: Status: ACTIVE | Noted: 2020-02-24

## 2020-02-24 PROBLEM — D22.5 MELANOCYTIC NEVI OF TRUNK: Status: ACTIVE | Noted: 2020-02-24

## 2020-02-24 PROBLEM — D22.72 MELANOCYTIC NEVI OF LEFT LOWER LIMB, INCLUDING HIP: Status: ACTIVE | Noted: 2020-02-24

## 2020-02-24 PROBLEM — D22.71 MELANOCYTIC NEVI OF RIGHT LOWER LIMB, INCLUDING HIP: Status: ACTIVE | Noted: 2020-02-24

## 2020-02-24 PROCEDURE — ? ADDITIONAL NOTES

## 2020-02-24 PROCEDURE — 99203 OFFICE O/P NEW LOW 30 MIN: CPT | Mod: 25

## 2020-02-24 PROCEDURE — 11103 TANGNTL BX SKIN EA SEP/ADDL: CPT

## 2020-02-24 PROCEDURE — ? BIOPSY BY SHAVE METHOD

## 2020-02-24 PROCEDURE — 11102 TANGNTL BX SKIN SINGLE LES: CPT

## 2020-02-24 PROCEDURE — ? COUNSELING

## 2020-02-24 ASSESSMENT — LOCATION DETAILED DESCRIPTION DERM
LOCATION DETAILED: LEFT SUPERIOR MEDIAL UPPER BACK
LOCATION DETAILED: LEFT ANTERIOR PROXIMAL THIGH
LOCATION DETAILED: LEFT INFERIOR MEDIAL FOREHEAD
LOCATION DETAILED: RIGHT ANTERIOR DISTAL UPPER ARM
LOCATION DETAILED: LEFT ANTERIOR DISTAL UPPER ARM
LOCATION DETAILED: RIGHT MEDIAL FOREHEAD
LOCATION DETAILED: LEFT ANTERIOR PROXIMAL UPPER ARM
LOCATION DETAILED: INFERIOR THORACIC SPINE
LOCATION DETAILED: RIGHT LATERAL ABDOMEN
LOCATION DETAILED: RIGHT ANTERIOR PROXIMAL UPPER ARM
LOCATION DETAILED: SUPERIOR THORACIC SPINE
LOCATION DETAILED: INFERIOR MID FOREHEAD
LOCATION DETAILED: LEFT MEDIAL UPPER BACK
LOCATION DETAILED: STERNAL NOTCH
LOCATION DETAILED: RIGHT ANTERIOR PROXIMAL THIGH
LOCATION DETAILED: LEFT MEDIAL MANDIBULAR CHEEK
LOCATION DETAILED: RIGHT MEDIAL BREAST 2-3:00 REGION
LOCATION DETAILED: MIDDLE STERNUM
LOCATION DETAILED: LOWER STERNUM
LOCATION DETAILED: EPIGASTRIC SKIN

## 2020-02-24 ASSESSMENT — LOCATION ZONE DERM
LOCATION ZONE: FACE
LOCATION ZONE: ARM
LOCATION ZONE: LEG
LOCATION ZONE: TRUNK

## 2020-02-24 ASSESSMENT — LOCATION SIMPLE DESCRIPTION DERM
LOCATION SIMPLE: LEFT UPPER ARM
LOCATION SIMPLE: RIGHT BREAST
LOCATION SIMPLE: ABDOMEN
LOCATION SIMPLE: RIGHT UPPER ARM
LOCATION SIMPLE: UPPER BACK
LOCATION SIMPLE: LEFT UPPER BACK
LOCATION SIMPLE: LEFT FOREHEAD
LOCATION SIMPLE: LEFT THIGH
LOCATION SIMPLE: RIGHT THIGH
LOCATION SIMPLE: CHEST
LOCATION SIMPLE: INFERIOR FOREHEAD
LOCATION SIMPLE: RIGHT FOREHEAD
LOCATION SIMPLE: LEFT CHEEK

## 2020-02-24 NOTE — HPI: SECONDARY COMPLAINT
How Severe Is This Condition?: mild
Additional History: Patient states this lesion looks similar to the one on her right breast which had been excised in 2018.

## 2020-02-24 NOTE — HPI: SKIN LESIONS
Is This A New Presentation, Or A Follow-Up?: Growths
How Severe Is Your Skin Lesion?: moderate
Have Your Skin Lesions Been Treated?: not been treated
Which Family Member (Optional)?: MGF

## 2020-02-24 NOTE — PROCEDURE: MIPS QUALITY
Detail Level: Generalized
Quality 226: Preventive Care And Screening: Tobacco Use: Screening And Cessation Intervention: Patient screened for tobacco use and is an ex/non-smoker
Quality 130: Documentation Of Current Medications In The Medical Record: Current Medications Documented
Quality 110: Preventive Care And Screening: Influenza Immunization: Influenza Immunization not Administered for Documented Reasons.

## 2020-02-24 NOTE — PROCEDURE: BIOPSY BY SHAVE METHOD
Detail Level: Detailed
Depth Of Biopsy: dermis
Was A Bandage Applied: Yes
Size Of Lesion In Cm: 0.8
X Size Of Lesion In Cm: 0
Biopsy Type: H and E
Biopsy Method: Dermablade
Anesthesia Type: 1% lidocaine with 1:100,000 epinephrine and a 1:12 solution of 8.4% sodium bicarbonate
Hemostasis: Pressure
Wound Care: Petrolatum
Dressing: bandage
Destruction After The Procedure: No
Type Of Destruction Used: Curettage
Curettage Text: The wound bed was treated with curettage after the biopsy was performed.
Cryotherapy Text: The wound bed was treated with cryotherapy after the biopsy was performed.
Electrodesiccation Text: The wound bed was treated with electrodesiccation after the biopsy was performed.
Electrodesiccation And Curettage Text: The wound bed was treated with electrodesiccation and curettage after the biopsy was performed.
Silver Nitrate Text: The wound bed was treated with silver nitrate after the biopsy was performed.
Lab: [id number]
Lab Facility: 
Consent: Written consent was obtained and risks were reviewed including but not limited to scarring, infection, bleeding, scabbing, incomplete removal, nerve damage and allergy to anesthesia.
Post-Care Instructions: I reviewed with the patient in detail post-care instructions. Patient is to keep the biopsy site dry overnight, and then apply bacitracin twice daily until healed. Patient may apply hydrogen peroxide soaks to remove any crusting.
Notification Instructions: Patient will be notified of biopsy results. However, patient instructed to call the office if not contacted within 2 weeks.
Billing Type: Third-Party Bill
Size Of Lesion In Cm: 1

## 2020-02-24 NOTE — PROCEDURE: ADDITIONAL NOTES
Additional Notes: Discussed option of a shave vs excisional biopsy. Risks of scarring and recurrence emphasized\\nPatient elects shave biopsy.
Detail Level: Detailed

## 2023-03-06 ENCOUNTER — APPOINTMENT (RX ONLY)
Dept: URBAN - METROPOLITAN AREA CLINIC 6 | Facility: CLINIC | Age: 38
Setting detail: DERMATOLOGY
End: 2023-03-06

## 2023-03-06 DIAGNOSIS — Z71.89 OTHER SPECIFIED COUNSELING: ICD-10-CM

## 2023-03-06 DIAGNOSIS — D18.0 HEMANGIOMA: ICD-10-CM

## 2023-03-06 DIAGNOSIS — L81.4 OTHER MELANIN HYPERPIGMENTATION: ICD-10-CM

## 2023-03-06 DIAGNOSIS — D22 MELANOCYTIC NEVI: ICD-10-CM

## 2023-03-06 DIAGNOSIS — D17 BENIGN LIPOMATOUS NEOPLASM: ICD-10-CM

## 2023-03-06 PROBLEM — D22.5 MELANOCYTIC NEVI OF TRUNK: Status: ACTIVE | Noted: 2023-03-06

## 2023-03-06 PROBLEM — D22.72 MELANOCYTIC NEVI OF LEFT LOWER LIMB, INCLUDING HIP: Status: ACTIVE | Noted: 2023-03-06

## 2023-03-06 PROBLEM — D22.61 MELANOCYTIC NEVI OF RIGHT UPPER LIMB, INCLUDING SHOULDER: Status: ACTIVE | Noted: 2023-03-06

## 2023-03-06 PROBLEM — D17.1 BENIGN LIPOMATOUS NEOPLASM OF SKIN AND SUBCUTANEOUS TISSUE OF TRUNK: Status: ACTIVE | Noted: 2023-03-06

## 2023-03-06 PROBLEM — D18.01 HEMANGIOMA OF SKIN AND SUBCUTANEOUS TISSUE: Status: ACTIVE | Noted: 2023-03-06

## 2023-03-06 PROCEDURE — ? COUNSELING

## 2023-03-06 PROCEDURE — ? MONITORING

## 2023-03-06 PROCEDURE — 99203 OFFICE O/P NEW LOW 30 MIN: CPT

## 2023-03-06 ASSESSMENT — LOCATION DETAILED DESCRIPTION DERM
LOCATION DETAILED: LEFT INFERIOR MEDIAL MIDBACK
LOCATION DETAILED: RIGHT INFERIOR LATERAL LOWER BACK
LOCATION DETAILED: RIGHT LATERAL ABDOMEN
LOCATION DETAILED: RIGHT ANTERIOR SHOULDER
LOCATION DETAILED: LEFT MEDIAL DORSAL FOOT
LOCATION DETAILED: LEFT MID-UPPER BACK

## 2023-03-06 ASSESSMENT — LOCATION SIMPLE DESCRIPTION DERM
LOCATION SIMPLE: RIGHT SHOULDER
LOCATION SIMPLE: ABDOMEN
LOCATION SIMPLE: LEFT UPPER BACK
LOCATION SIMPLE: RIGHT LOWER BACK
LOCATION SIMPLE: LEFT FOOT
LOCATION SIMPLE: LEFT LOWER BACK

## 2023-03-06 ASSESSMENT — LOCATION ZONE DERM
LOCATION ZONE: ARM
LOCATION ZONE: TRUNK
LOCATION ZONE: FEET

## 2023-03-06 NOTE — PROCEDURE: MIPS QUALITY
Detail Level: Detailed
Quality 226: Preventive Care And Screening: Tobacco Use: Screening And Cessation Intervention: Patient screened for tobacco use and is an ex/non-smoker
Quality 265: Biopsy Follow-Up: Biopsy results reviewed, communicated, tracked, and documented
Quality 130: Documentation Of Current Medications In The Medical Record: Current Medications Documented
Quality 402: Tobacco Use And Help With Quitting Among Adolescents: Patient screened for tobacco and never smoked
Quality 431: Preventive Care And Screening: Unhealthy Alcohol Use - Screening: Patient not identified as an unhealthy alcohol user when screened for unhealthy alcohol use using a systematic screening method

## 2023-03-06 NOTE — HPI: PREVENTATIVE SKIN CHECK
What Is The Reason For Today's Visit?: Full Body Skin Examination
Additional History: Per patient PCP Dr. Osvaldo Calvo did several biopsies on her chest abdomen that resulted in “melanoma cells”, we will request these path results and chart notes.

## 2023-04-18 PROBLEM — D17.20 LIPOMA OF HIP: Status: ACTIVE | Noted: 2023-04-18

## 2023-04-18 PROBLEM — M25.551 RIGHT HIP PAIN: Status: ACTIVE | Noted: 2023-04-18

## 2023-06-27 ENCOUNTER — APPOINTMENT (RX ONLY)
Dept: URBAN - METROPOLITAN AREA CLINIC 4 | Facility: CLINIC | Age: 38
Setting detail: DERMATOLOGY
End: 2023-06-27

## 2023-06-27 DIAGNOSIS — Z71.89 OTHER SPECIFIED COUNSELING: ICD-10-CM

## 2023-06-27 DIAGNOSIS — D22 MELANOCYTIC NEVI: ICD-10-CM

## 2023-06-27 DIAGNOSIS — Z87.2 PERSONAL HISTORY OF DISEASES OF THE SKIN AND SUBCUTANEOUS TISSUE: ICD-10-CM

## 2023-06-27 PROBLEM — D22.5 MELANOCYTIC NEVI OF TRUNK: Status: ACTIVE | Noted: 2023-06-27

## 2023-06-27 PROBLEM — D22.61 MELANOCYTIC NEVI OF RIGHT UPPER LIMB, INCLUDING SHOULDER: Status: ACTIVE | Noted: 2023-06-27

## 2023-06-27 PROBLEM — D22.72 MELANOCYTIC NEVI OF LEFT LOWER LIMB, INCLUDING HIP: Status: ACTIVE | Noted: 2023-06-27

## 2023-06-27 PROCEDURE — ? DIAGNOSIS COMMENT

## 2023-06-27 PROCEDURE — ? COUNSELING

## 2023-06-27 PROCEDURE — 99212 OFFICE O/P EST SF 10 MIN: CPT

## 2023-06-27 PROCEDURE — ? MONITORING

## 2023-06-27 ASSESSMENT — LOCATION DETAILED DESCRIPTION DERM
LOCATION DETAILED: LEFT INFERIOR MEDIAL MIDBACK
LOCATION DETAILED: RIGHT LATERAL ABDOMEN
LOCATION DETAILED: RIGHT MEDIAL BREAST 3-4:00 REGION
LOCATION DETAILED: LEFT MEDIAL DORSAL FOOT
LOCATION DETAILED: RIGHT ANTERIOR SHOULDER
LOCATION DETAILED: LEFT MID-UPPER BACK

## 2023-06-27 ASSESSMENT — LOCATION ZONE DERM
LOCATION ZONE: ARM
LOCATION ZONE: FEET
LOCATION ZONE: TRUNK

## 2023-06-27 ASSESSMENT — LOCATION SIMPLE DESCRIPTION DERM
LOCATION SIMPLE: RIGHT BREAST
LOCATION SIMPLE: LEFT UPPER BACK
LOCATION SIMPLE: ABDOMEN
LOCATION SIMPLE: LEFT LOWER BACK
LOCATION SIMPLE: RIGHT SHOULDER
LOCATION SIMPLE: LEFT FOOT

## 2023-06-27 NOTE — PROCEDURE: DIAGNOSIS COMMENT
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 Treated with excision.
Detail Level: Simple
Render Risk Assessment In Note?: no

## 2024-05-03 ENCOUNTER — APPOINTMENT (OUTPATIENT)
Dept: SPORTS MEDICINE | Facility: OTHER | Age: 39
End: 2024-05-03

## (undated) DEVICE — HEAD HOLDER JUNIOR/ADULT

## (undated) DEVICE — SET IRRIGATION CYSTOSCOPY TUBE L80 IN (20EA/CA)

## (undated) DEVICE — DRAPE 3 ARM DA VANCI SI - (5EA/CA)

## (undated) DEVICE — ARMREST CRADLE FOAM - (2PR/PK 12PR/CA)

## (undated) DEVICE — UTERINE MANIP V-CARE LARGE - (DAVINCI)  8/CA

## (undated) DEVICE — PACK GYN DAVINCI (2EA/CA)

## (undated) DEVICE — GLOVE BIOGEL PI INDICATOR SZ 6.5 SURGICAL PF LF - (50/BX 4BX/CA)

## (undated) DEVICE — OBTURATOR 8MM BLADELESS - (24EA/BX) DA VINCI

## (undated) DEVICE — TUBING CLEARLINK DUO-VENT - C-FLO (48EA/CA)

## (undated) DEVICE — INSERT HARMONIC CRVD SHEAR - (5/BX) DA VINCI

## (undated) DEVICE — SHEAR, HARMONIC CRVD 8MM

## (undated) DEVICE — TUBE CONNECT SUCTION CLEAR 120 X 1/4" (50EA/CA)"

## (undated) DEVICE — APPLICATOR DUPLO SPRAYER (5EA/CA)

## (undated) DEVICE — GOWN WARMING STANDARD FLEX - (30/CA)

## (undated) DEVICE — SUCTION INSTRUMENT YANKAUER BULBOUS TIP W/O VENT (50EA/CA)

## (undated) DEVICE — SLEEVE, VASO, THIGH, MED

## (undated) DEVICE — NEEDLE INSUFFLATION LONG STEP (12EA/CA)

## (undated) DEVICE — LEAD SET 6 DISP. EKG NIHON KOHDEN

## (undated) DEVICE — SENSOR SPO2 NEO LNCS ADHESIVE (20/BX) SEE USER NOTES

## (undated) DEVICE — ELECTRODE 850 FOAM ADHESIVE - HYDROGEL RADIOTRNSPRNT (50/PK)

## (undated) DEVICE — SUTURE GENERAL

## (undated) DEVICE — TUBE E-T HI-LO CUFF 7.0MM (10EA/PK)

## (undated) DEVICE — ROBOTIC SURGERY SERVICES

## (undated) DEVICE — KIT ROOM DECONTAMINATION

## (undated) DEVICE — SUTURE 2-0 14CM X 14CM STRATAFIX SPIRAL PDO VIO MH NEEDLE (12/BX)

## (undated) DEVICE — WATER IRRIG. STER. 1500 ML - (9/CA)

## (undated) DEVICE — CANISTER SUCTION 3000ML MECHANICAL FILTER AUTO SHUTOFF MEDI-VAC NONSTERILE LF DISP  (40EA/CA)

## (undated) DEVICE — KIT ANESTHESIA W/CIRCUIT & 3/LT BAG W/FILTER (20EA/CA)

## (undated) DEVICE — BLADE SURGICAL CLIPPER - (50EA/CA)

## (undated) DEVICE — SET LEADWIRE 5 LEAD BEDSIDE DISPOSABLE ECG (1SET OF 5/EA)

## (undated) DEVICE — NEPTUNE 4 PORT MANIFOLD - (20/PK)

## (undated) DEVICE — PAD OR TABLE DA VINCI 2IN X 20IN X 72IN - (12EA/CA)

## (undated) DEVICE — SET EXTENSION WITH 2 PORTS (48EA/CA) ***PART #2C8610 IS A SUBSTITUTE*****

## (undated) DEVICE — GLOVE BIOGEL PI ORTHO SZ 7.5 PF LF (40PR/BX)

## (undated) DEVICE — ELECTRODE DUAL RETURN W/ CORD - (50/PK)

## (undated) DEVICE — DERMABOND ADVANCED - (12EA/BX)

## (undated) DEVICE — SUTURE 2-0 VICRYL PLUS CT-2 - 27 INCH (36/BX)

## (undated) DEVICE — MASK ANESTHESIA ADULT  - (100/CA)

## (undated) DEVICE — GLOVE BIOGEL SZ 6.5 SURGICAL PF LTX (50PR/BX 4BX/CA)

## (undated) DEVICE — FORCEP BIPOLAR MARYLAND DA VINCI 10X'S REUSABLE (10UN/EA)

## (undated) DEVICE — WATER IRRIG. STER 3000 ML - (4/CA)

## (undated) DEVICE — LACTATED RINGERS INJ 1000 ML - (14EA/CA 60CA/PF)

## (undated) DEVICE — SET SUCTION/IRRIGATION WITH DISPOSABLE TIP (6/CA )PART #0250-070-520 IS A SUB

## (undated) DEVICE — NEEDLE DRIVER MEGA - DA VINCI 10X'S REUSABLE

## (undated) DEVICE — SUTURE 0 VICRYL PLUS CT-2 - 27 INCH (36/BX)

## (undated) DEVICE — TROCAR VERSAPORT 12MM LONG - (3EA/CA)

## (undated) DEVICE — SEAL CANNULA DA VINCI - (10/BX)